# Patient Record
Sex: FEMALE | Race: WHITE | NOT HISPANIC OR LATINO | Employment: FULL TIME | URBAN - METROPOLITAN AREA
[De-identification: names, ages, dates, MRNs, and addresses within clinical notes are randomized per-mention and may not be internally consistent; named-entity substitution may affect disease eponyms.]

---

## 2019-09-29 ENCOUNTER — HOSPITAL ENCOUNTER (OUTPATIENT)
Facility: HOSPITAL | Age: 54
Discharge: HOME | End: 2019-09-30
Attending: EMERGENCY MEDICINE | Admitting: ORTHOPAEDIC SURGERY
Payer: MEDICAID

## 2019-09-29 DIAGNOSIS — S52.692A OTHER CLOSED FRACTURE OF DISTAL END OF LEFT ULNA, INITIAL ENCOUNTER: ICD-10-CM

## 2019-09-29 DIAGNOSIS — I95.9 HYPOTENSION, UNSPECIFIED HYPOTENSION TYPE: ICD-10-CM

## 2019-09-29 DIAGNOSIS — M25.532 LEFT WRIST PAIN: Primary | ICD-10-CM

## 2019-09-29 DIAGNOSIS — F10.920 ALCOHOLIC INTOXICATION WITHOUT COMPLICATION: ICD-10-CM

## 2019-09-29 DIAGNOSIS — Z01.810 PREOP CARDIOVASCULAR EXAM: ICD-10-CM

## 2019-09-29 DIAGNOSIS — S52.502A CLOSED FRACTURE OF DISTAL END OF LEFT RADIUS, UNSPECIFIED FRACTURE MORPHOLOGY, INITIAL ENCOUNTER: ICD-10-CM
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
## 2019-09-29 PROBLEM — S52.92XA FOREARM FRACTURES, BOTH BONES, CLOSED, LEFT, INITIAL ENCOUNTER: Status: ACTIVE | Noted: 2019-09-29

## 2019-09-29 PROBLEM — S52.202A FOREARM FRACTURES, BOTH BONES, CLOSED, LEFT, INITIAL ENCOUNTER: Status: ACTIVE | Noted: 2019-09-29

## 2019-09-29 LAB
ALBUMIN SERPL BCP-MCNC: 3.5 G/DL (ref 3.5–5.2)
ALP SERPL-CCNC: 144 U/L (ref 55–135)
ALT SERPL W/O P-5'-P-CCNC: 18 U/L (ref 10–44)
ANION GAP SERPL CALC-SCNC: 13 MMOL/L (ref 8–16)
AST SERPL-CCNC: 22 U/L (ref 10–40)
BASOPHILS # BLD AUTO: 0.07 K/UL (ref 0–0.2)
BASOPHILS NFR BLD: 0.5 % (ref 0–1.9)
BILIRUB SERPL-MCNC: 0.2 MG/DL (ref 0.1–1)
BUN SERPL-MCNC: 15 MG/DL (ref 6–20)
CALCIUM SERPL-MCNC: 8.1 MG/DL (ref 8.7–10.5)
CHLORIDE SERPL-SCNC: 107 MMOL/L (ref 95–110)
CO2 SERPL-SCNC: 19 MMOL/L (ref 23–29)
CREAT SERPL-MCNC: 0.7 MG/DL (ref 0.5–1.4)
DIFFERENTIAL METHOD: ABNORMAL
EOSINOPHIL # BLD AUTO: 0.1 K/UL (ref 0–0.5)
EOSINOPHIL NFR BLD: 0.6 % (ref 0–8)
ERYTHROCYTE [DISTWIDTH] IN BLOOD BY AUTOMATED COUNT: 12.2 % (ref 11.5–14.5)
EST. GFR  (AFRICAN AMERICAN): >60 ML/MIN/1.73 M^2
EST. GFR  (NON AFRICAN AMERICAN): >60 ML/MIN/1.73 M^2
ESTIMATED AVG GLUCOSE: 100 MG/DL (ref 68–131)
GLUCOSE SERPL-MCNC: 101 MG/DL (ref 70–110)
HBA1C MFR BLD HPLC: 5.1 % (ref 4–5.6)
HCT VFR BLD AUTO: 40.5 % (ref 37–48.5)
HGB BLD-MCNC: 13.6 G/DL (ref 12–16)
IMM GRANULOCYTES # BLD AUTO: 0.05 K/UL (ref 0–0.04)
IMM GRANULOCYTES NFR BLD AUTO: 0.4 % (ref 0–0.5)
INR PPP: 1.1 (ref 0.8–1.2)
LYMPHOCYTES # BLD AUTO: 1.7 K/UL (ref 1–4.8)
LYMPHOCYTES NFR BLD: 12.1 % (ref 18–48)
MAGNESIUM SERPL-MCNC: 1.9 MG/DL (ref 1.6–2.6)
MCH RBC QN AUTO: 31.9 PG (ref 27–31)
MCHC RBC AUTO-ENTMCNC: 33.6 G/DL (ref 32–36)
MCV RBC AUTO: 95 FL (ref 82–98)
MONOCYTES # BLD AUTO: 0.8 K/UL (ref 0.3–1)
MONOCYTES NFR BLD: 5.4 % (ref 4–15)
NEUTROPHILS # BLD AUTO: 11.6 K/UL (ref 1.8–7.7)
NEUTROPHILS NFR BLD: 81 % (ref 38–73)
NRBC BLD-RTO: 0 /100 WBC
PHOSPHATE SERPL-MCNC: 3.5 MG/DL (ref 2.7–4.5)
PLATELET # BLD AUTO: 272 K/UL (ref 150–350)
PMV BLD AUTO: 9.7 FL (ref 9.2–12.9)
POTASSIUM SERPL-SCNC: 3.3 MMOL/L (ref 3.5–5.1)
PREALB SERPL-MCNC: 22 MG/DL (ref 20–43)
PROT SERPL-MCNC: 6.5 G/DL (ref 6–8.4)
PROTHROMBIN TIME: 11 SEC (ref 9–12.5)
RBC # BLD AUTO: 4.27 M/UL (ref 4–5.4)
SODIUM SERPL-SCNC: 139 MMOL/L (ref 136–145)
TRANSFERRIN SERPL-MCNC: 256 MG/DL (ref 200–375)
WBC # BLD AUTO: 14.27 K/UL (ref 3.9–12.7)

## 2019-09-29 PROCEDURE — 93010 ELECTROCARDIOGRAM REPORT: CPT | Mod: ,,, | Performed by: INTERNAL MEDICINE

## 2019-09-29 PROCEDURE — 96375 TX/PRO/DX INJ NEW DRUG ADDON: CPT

## 2019-09-29 PROCEDURE — 63600175 PHARM REV CODE 636 W HCPCS: Performed by: STUDENT IN AN ORGANIZED HEALTH CARE EDUCATION/TRAINING PROGRAM

## 2019-09-29 PROCEDURE — 83735 ASSAY OF MAGNESIUM: CPT

## 2019-09-29 PROCEDURE — 96361 HYDRATE IV INFUSION ADD-ON: CPT

## 2019-09-29 PROCEDURE — 99285 EMERGENCY DEPT VISIT HI MDM: CPT | Mod: 25

## 2019-09-29 PROCEDURE — 83036 HEMOGLOBIN GLYCOSYLATED A1C: CPT

## 2019-09-29 PROCEDURE — G0378 HOSPITAL OBSERVATION PER HR: HCPCS

## 2019-09-29 PROCEDURE — 84466 ASSAY OF TRANSFERRIN: CPT

## 2019-09-29 PROCEDURE — 84134 ASSAY OF PREALBUMIN: CPT

## 2019-09-29 PROCEDURE — 99285 PR EMERGENCY DEPT VISIT,LEVEL V: ICD-10-PCS | Mod: ,,, | Performed by: EMERGENCY MEDICINE

## 2019-09-29 PROCEDURE — 93005 ELECTROCARDIOGRAM TRACING: CPT

## 2019-09-29 PROCEDURE — 85025 COMPLETE CBC W/AUTO DIFF WBC: CPT

## 2019-09-29 PROCEDURE — 85610 PROTHROMBIN TIME: CPT

## 2019-09-29 PROCEDURE — 84100 ASSAY OF PHOSPHORUS: CPT

## 2019-09-29 PROCEDURE — 96374 THER/PROPH/DIAG INJ IV PUSH: CPT

## 2019-09-29 PROCEDURE — 80053 COMPREHEN METABOLIC PANEL: CPT

## 2019-09-29 PROCEDURE — 99285 EMERGENCY DEPT VISIT HI MDM: CPT | Mod: ,,, | Performed by: EMERGENCY MEDICINE

## 2019-09-29 PROCEDURE — 93010 EKG 12-LEAD: ICD-10-PCS | Mod: ,,, | Performed by: INTERNAL MEDICINE

## 2019-09-29 PROCEDURE — 25000003 PHARM REV CODE 250: Performed by: STUDENT IN AN ORGANIZED HEALTH CARE EDUCATION/TRAINING PROGRAM

## 2019-09-29 PROCEDURE — 25605 CLTX DST RDL FX/EPHYS SEP W/: CPT | Mod: LT

## 2019-09-29 RX ORDER — ONDANSETRON 2 MG/ML
4 INJECTION INTRAMUSCULAR; INTRAVENOUS
Status: COMPLETED | OUTPATIENT
Start: 2019-09-29 | End: 2019-09-29

## 2019-09-29 RX ORDER — SODIUM CHLORIDE 0.9 % (FLUSH) 0.9 %
2 SYRINGE (ML) INJECTION
Status: DISCONTINUED | OUTPATIENT
Start: 2019-09-30 | End: 2019-09-30 | Stop reason: HOSPADM

## 2019-09-29 RX ORDER — LIDOCAINE HYDROCHLORIDE 10 MG/ML
20 INJECTION INFILTRATION; PERINEURAL ONCE
Status: COMPLETED | OUTPATIENT
Start: 2019-09-29 | End: 2019-09-29

## 2019-09-29 RX ORDER — OXYCODONE HYDROCHLORIDE 5 MG/1
5 TABLET ORAL EVERY 4 HOURS PRN
Status: DISCONTINUED | OUTPATIENT
Start: 2019-09-30 | End: 2019-09-30 | Stop reason: HOSPADM

## 2019-09-29 RX ORDER — ACETAMINOPHEN 325 MG/1
650 TABLET ORAL EVERY 4 HOURS PRN
Status: DISCONTINUED | OUTPATIENT
Start: 2019-09-30 | End: 2019-09-30 | Stop reason: HOSPADM

## 2019-09-29 RX ORDER — LIDOCAINE HYDROCHLORIDE AND EPINEPHRINE 10; 10 MG/ML; UG/ML
10 INJECTION, SOLUTION INFILTRATION; PERINEURAL ONCE
Status: DISCONTINUED | OUTPATIENT
Start: 2019-09-29 | End: 2019-09-30 | Stop reason: HOSPADM

## 2019-09-29 RX ORDER — CEFAZOLIN SODIUM 1 G/3ML
2 INJECTION, POWDER, FOR SOLUTION INTRAMUSCULAR; INTRAVENOUS ONCE
Status: COMPLETED | OUTPATIENT
Start: 2019-09-29 | End: 2019-09-29

## 2019-09-29 RX ORDER — MORPHINE SULFATE 4 MG/ML
4 INJECTION, SOLUTION INTRAMUSCULAR; INTRAVENOUS
Status: COMPLETED | OUTPATIENT
Start: 2019-09-29 | End: 2019-09-29

## 2019-09-29 RX ORDER — ONDANSETRON 2 MG/ML
8 INJECTION INTRAMUSCULAR; INTRAVENOUS EVERY 12 HOURS PRN
Status: DISCONTINUED | OUTPATIENT
Start: 2019-09-30 | End: 2019-09-30 | Stop reason: HOSPADM

## 2019-09-29 RX ADMIN — LIDOCAINE HYDROCHLORIDE 20 ML: 10 INJECTION, SOLUTION INFILTRATION; PERINEURAL at 10:09

## 2019-09-29 RX ADMIN — CEFAZOLIN 2 G: 1 INJECTION, POWDER, FOR SOLUTION INTRAMUSCULAR; INTRAVENOUS at 11:09

## 2019-09-29 RX ADMIN — ONDANSETRON 4 MG: 2 INJECTION INTRAMUSCULAR; INTRAVENOUS at 08:09

## 2019-09-29 RX ADMIN — SODIUM CHLORIDE, SODIUM LACTATE, POTASSIUM CHLORIDE, AND CALCIUM CHLORIDE 1000 ML: .6; .31; .03; .02 INJECTION, SOLUTION INTRAVENOUS at 08:09

## 2019-09-29 RX ADMIN — MORPHINE SULFATE 4 MG: 4 INJECTION, SOLUTION INTRAMUSCULAR; INTRAVENOUS at 08:09

## 2019-09-30 VITALS
OXYGEN SATURATION: 92 % | HEART RATE: 65 BPM | SYSTOLIC BLOOD PRESSURE: 125 MMHG | TEMPERATURE: 98 F | DIASTOLIC BLOOD PRESSURE: 81 MMHG | RESPIRATION RATE: 18 BRPM

## 2019-09-30 PROBLEM — S52.502A CLOSED FRACTURE OF LEFT DISTAL RADIUS AND ULNA: Status: ACTIVE | Noted: 2019-09-29

## 2019-09-30 PROBLEM — S52.602A CLOSED FRACTURE OF LEFT DISTAL RADIUS AND ULNA: Status: ACTIVE | Noted: 2019-09-29

## 2019-09-30 PROCEDURE — 25605 CLTX DST RDL FX/EPHYS SEP W/: CPT | Mod: LT

## 2019-09-30 PROCEDURE — G0378 HOSPITAL OBSERVATION PER HR: HCPCS

## 2019-09-30 PROCEDURE — 63600175 PHARM REV CODE 636 W HCPCS: Performed by: STUDENT IN AN ORGANIZED HEALTH CARE EDUCATION/TRAINING PROGRAM

## 2019-09-30 PROCEDURE — 25000003 PHARM REV CODE 250: Performed by: STUDENT IN AN ORGANIZED HEALTH CARE EDUCATION/TRAINING PROGRAM

## 2019-09-30 RX ORDER — ONDANSETRON 4 MG/1
8 TABLET, FILM COATED ORAL EVERY 8 HOURS PRN
Qty: 60 TABLET | Refills: 0 | Status: ON HOLD | OUTPATIENT
Start: 2019-09-30 | End: 2019-10-10 | Stop reason: HOSPADM

## 2019-09-30 RX ORDER — OXYCODONE AND ACETAMINOPHEN 10; 325 MG/1; MG/1
1 TABLET ORAL EVERY 4 HOURS PRN
Qty: 42 TABLET | Refills: 0 | Status: SHIPPED | OUTPATIENT
Start: 2019-09-30 | End: 2019-10-07

## 2019-09-30 RX ADMIN — ONDANSETRON 8 MG: 2 INJECTION INTRAMUSCULAR; INTRAVENOUS at 08:09

## 2019-09-30 RX ADMIN — OXYCODONE HYDROCHLORIDE 15 MG: 10 TABLET ORAL at 05:09

## 2019-09-30 RX ADMIN — OXYCODONE HYDROCHLORIDE 15 MG: 10 TABLET ORAL at 12:09

## 2019-09-30 NOTE — ED NOTES
Patient identifiers verified and correct for Stephanie Molina.    LOC: The patient is awake, alert and aware of environment with an appropriate affect, the patient is oriented x 3 and speaking appropriately. Arrived in left arm, sling device     APPEARANCE: Patient resting comfortably and in no acute distress, patient is clean and well groomed, patient's clothing is properly fastened.    SKIN: The skin is warm and dry, color consistent with ethnicity, patient has normal skin turgor and moist mucus membranes, skin intact, no breakdown or bruising noted.    MUSCULOSKELETAL: Patient moving all extremities spontaneously, left arm visible deformity     RESPIRATORY: Airway is open and patent, respirations are spontaneous, patient has a normal effort and rate, no accessory muscle use noted, bilateral breath sounds clear    CARDIAC: Patient has a normal rate and regular rhythm, left arm edema,  capillary refill < 3 seconds.    ABDOMEN: Soft and non tender to palpation, no distention noted, normoactive bowel sounds present in all four quadrants.    NEUROLOGIC: PERRLA, 3 mm bilaterally, eyes open spontaneously, behavior appropriate to situation, follows commands, facial expression symmetrical

## 2019-09-30 NOTE — ED PROVIDER NOTES
Encounter Date: 9/29/2019       History     Chief Complaint   Patient presents with    Fall     Pt arrives via EMS after falling from 2 steps. Deformity noted to left arm. +ETOH.     HPI   54 year old female history of tobacco use and marijuana use presenting today after missing her step and falling from two steps without any head trauma, LOC. She caught herself with her left hand and has significant 10/10 sharp shooting pain in her left wrist and believes it is broken. She denies any numbness or tingling in her arm, denies shoulder pain. She denies any other pain or sustained trauma. Was drinking today and is mildly intoxicated, denies any preceding vision changes, chest pain, shortness of breath, nausea, vomiting, or abdominal pain. Is not up to date on her tetanus, is not on blood thinners and has not taken anything for the pain. She's had prior left wrist fractures. Did not receive any pain medication with EMS.   Currently feeling nauseous.     Review of patient's allergies indicates:  No Known Allergies  History reviewed. No pertinent past medical history.  Past Surgical History:   Procedure Laterality Date    ceserean       No family history on file.  Social History     Tobacco Use    Smoking status: Current Every Day Smoker    Smokeless tobacco: Never Used   Substance Use Topics    Alcohol use: Yes    Drug use: Yes     Types: Marijuana     Review of Systems   Constitutional: Negative for chills and fever.   HENT: Negative for congestion and drooling.    Eyes: Negative for photophobia and visual disturbance.   Respiratory: Negative for cough, chest tightness and shortness of breath.    Cardiovascular: Negative for chest pain and leg swelling.   Gastrointestinal: Positive for nausea. Negative for abdominal distention, abdominal pain and vomiting.   Genitourinary: Negative for difficulty urinating and dysuria.   Musculoskeletal: Negative for back pain, neck pain and neck stiffness.   Skin: Positive for  wound. Negative for rash.   Neurological: Negative for dizziness, seizures, syncope, light-headedness and headaches.   All other systems reviewed and are negative.      Physical Exam     Initial Vitals [09/29/19 1956]   BP Pulse Resp Temp SpO2   100/60 88 16 98.9 °F (37.2 °C) 99 %      MAP       --         Physical Exam    Nursing note and vitals reviewed.  Constitutional: She appears well-developed and well-nourished.   Uncomfortable appearing woman    HENT:   Head: Normocephalic and atraumatic.   Eyes: EOM are normal. Pupils are equal, round, and reactive to light.   Neck: Normal range of motion. Neck supple.   Cardiovascular: Normal rate and regular rhythm.   Pulmonary/Chest: Breath sounds normal. No respiratory distress.   Abdominal: Soft. She exhibits no distension.   Musculoskeletal:   Obvious deformity to her left wrist, Extreme tenderness to slight palpation at the wrist, intact sensation throughout each digit, intact distal pulse, no open bone or laceration  Inability to range at the wrist, intact shoulder strength, no shoulder TTP, decreased ROM at the elbow.    Neurological: She is alert and oriented to person, place, and time.   Slurring her speech, acutely intoxicated          ED Course   Procedures  Labs Reviewed - No data to display       Imaging Results    None                       Attending Attestation:   Physician Attestation Statement for Resident:  As the supervising MD   Physician Attestation Statement: I have personally seen and examined this patient.   I agree with the above history. -:   As the supervising MD I agree with the above PE.   - with the following exceptions: Slight paresthesia in the ulnar distribution of affected hand, concerning for nerve compression   As the supervising MD I agree with the above treatment, course, plan, and disposition.                       Clinical Impression:       ICD-10-CM ICD-9-CM   1. Left wrist pain M25.532 719.43   2. Fall W19.XXXA E888.9       54  year old female otherwise healthy presenting today after a mechanical fall with sustained left wrist pain. On examination patients vitals are significant for a blood pressure of 100/60 but otherwise vitals within normal limits. On examination she is slightly slurring her speech secondary to acute intoxication. She has not signs of head or neck trauma, she has a deformity noted to her left wrist with intact sensation and distal pulses. Overlying skin intact without any exposed bone. She has intact shoulder strength and decreased flexion and extension at the elbow.   Based on history and physical examination I believe she has a fracture of her wrist. Differential diagnosis includes metacarpal fractures, radial fracture, ulnar fracture, humerus fracture.   Will update her tetanus, give her zofran for nausea, and morphine for pain control. Given her hypotension after morphine will give her a liter of fluids.     Dispo pending imaging. I anticipate an orthopedic surgery consult.   patient stable at this time.     [facility] Emergency Medicine, PGY2   9/29/2019 8:29 PM          PGY-2 Update:  X-ray concerning for ulnar and radial fracture  See read below:  Comminuted impacted fracture distal left radius with intra-articular extension and posterior displacement and angulation of fracture fragments. Comminuted impacted fracture distal left ulna with intra-articular extension and posterior displacement and angulation of fracture fragments    Orthopedic surgery consulted, will await their revaluation and recommendations.   [facility] Emergency Medicine, PGY2   9/29/2019 9:26 PM      PGY-2 Update:  Orthopedic surgery at bedside placing patient in finger traps. Preparing for reduction.     [facility] Emergency Medicine, PGY2   9/29/2019 9:44 PM      PGY-2 Update:  Patient admitted to orthopedic services given concerns over carpal tunnel nerve involvement.     [facility] Emergency  Medicine, PGY2   9/29/2019 11:28 PM         Jaquelin Joseph MD  Resident  09/29/19 2332       Amanda Dubose MD  09/30/19 0157

## 2019-09-30 NOTE — SUBJECTIVE & OBJECTIVE
History reviewed. No pertinent past medical history.    Past Surgical History:   Procedure Laterality Date    ceserean         Review of patient's allergies indicates:  No Known Allergies    Current Facility-Administered Medications   Medication    [START ON 9/30/2019] acetaminophen tablet 650 mg    lidocaine-EPINEPHrine 1%-1:100,000 injection 10 mL    [START ON 9/30/2019] ondansetron injection 8 mg    [START ON 9/30/2019] oxyCODONE immediate release tablet 15 mg    [START ON 9/30/2019] oxyCODONE immediate release tablet 5 mg    [START ON 9/30/2019] promethazine (PHENERGAN) 6.25 mg in dextrose 5 % 50 mL IVPB    [START ON 9/30/2019] sodium chloride 0.9% flush 2 mL    Tdap vaccine injection 0.5 mL     No current outpatient medications on file.     Family History     None        Tobacco Use    Smoking status: Current Every Day Smoker    Smokeless tobacco: Never Used   Substance and Sexual Activity    Alcohol use: Yes    Drug use: Yes     Types: Marijuana    Sexual activity: Not on file     ROS   ROS per ED note.  Objective:     Vital Signs (Most Recent):  Temp: 98.9 °F (37.2 °C) (09/29/19 1956)  Pulse: 88 (09/29/19 2241)  Resp: 19 (09/29/19 2204)  BP: 125/72 (09/29/19 2241)  SpO2: 95 % (09/29/19 2244) Vital Signs (24h Range):  Temp:  [98.9 °F (37.2 °C)] 98.9 °F (37.2 °C)  Pulse:  [62-88] 88  Resp:  [16-20] 19  SpO2:  [88 %-99 %] 95 %  BP: ()/(60-84) 125/72           There is no height or weight on file to calculate BMI.      Intake/Output Summary (Last 24 hours) at 9/29/2019 6270  Last data filed at 9/29/2019 2257  Gross per 24 hour   Intake 1000 ml   Output --   Net 1000 ml       Ortho/SPM Exam   PE:  Gen:  No acute distress  CV:  Peripherally well-perfused.    Lungs:  Normal respiratory effort.  Head/Neck:  Normocephalic.  Atraumatic.     LUE:  Skin intact, 3mm scratch to volar aspect of L wrist   Obvious deformity to Left wrist  No open wounds/abrasions/laceration  Bony TTP to Left wrist  ROM at  Left wrist, elbow and hand limited 2/2 to L wrist pain.  SILT U/R  Diminished sensation to light touch in median nerve distribution  Motor intact AIN/PIN/M/U/R   Cap refill < 2s  2+ RP      Significant Labs: All pertinent labs within the past 24 hours have been reviewed.    Significant Imaging: I have reviewed all pertinent imaging results/findings.     Procedure Note: left distal radius reduction  Patient was explained risks, benefits, and alternatives to treatment and verbalized consent to proceed. Time out was performed and patient name, , site, and procedure were confirmed. 10 cc of 1% lidocaine in 25 gauge needle was used for hematoma block. Fracture was reduced under fluoroscopy. Sugar tong splint was applied in typical fashion. Post-reduction films were performed and confirmed adequate reduction. Patient tolerated the procedure well.

## 2019-09-30 NOTE — ASSESSMENT & PLAN NOTE
Stephanie Molina is a 54 y.o. female with a left distal radius and ulna fracture. She has a history of left distal radius fracture 2 years ago treated non-operatively. There are no signs of open fracture. She did present with numbness in the thumb, index, and long fingers concerning for acute carpal tunnel syndrome. Her fracture was reduced and splinted in the ED. Her numbness and tingling began to resolve shortly after reduction. Will admit to observation for precaution to monitor for return of CTS symptoms.   - NWB LUE  - keep LUE iced and elevated  - NPO as precaution

## 2019-09-30 NOTE — ED TRIAGE NOTES
"Stephanie Molina, a 54 y.o. female presents to the ED post fall. Patient drank a few drinks( vodka) and fell from 2 steps. Patient tried to brace the fall with her left arm and " I think I broke it"        Triage note:  Chief Complaint   Patient presents with    Fall     Pt arrives via EMS after falling from 2 steps. Deformity noted to left arm. +ETOH.     Review of patient's allergies indicates:  No Known Allergies  History reviewed. No pertinent past medical history.    "

## 2019-09-30 NOTE — H&P
[facility]  Orthopedics  H&P    Patient Name: Stephanie Molina  MRN: [id number]  Admission Date: 9/29/2019  Primary Care Provider: Primary Doctor No      Subjective:     Principal Problem:Closed fracture of left distal radius and ulna    Chief Complaint:   Chief Complaint   Patient presents with    Fall     Pt arrives via EMS after falling from 2 steps. Deformity noted to left arm. +ETOH.        HPI: Stephanie Molina is a 54 y.o. female with PMHx of prior Left distal radius fracture (2017- tx non-operatively) presents to ED with left wrist pain after fall onto outstretched left hand around 7:00PM. The patient states she was walking upstairs and missed a step causing her to fall down 2 steps onto her left hand. The patient states she noticed immediate pain, swelling and deformity to her Left wrist. She states the pain is worsened by any movement of her left wrist or left fingers, and improved at rest. The patient endorses N/T to the thumb, index, and long fingers which has been present since her fall, but denies N/T to 4th-5th digits. The patient is Right hand dominant and works as an . The patient denies anticoagulant use. She is a daily smoker. Denies any other MSK pains or paresthesias.       History reviewed. No pertinent past medical history.    Past Surgical History:   Procedure Laterality Date    ceserean         Review of patient's allergies indicates:  No Known Allergies    Current Facility-Administered Medications   Medication    [START ON 9/30/2019] acetaminophen tablet 650 mg    lidocaine-EPINEPHrine 1%-1:100,000 injection 10 mL    [START ON 9/30/2019] ondansetron injection 8 mg    [START ON 9/30/2019] oxyCODONE immediate release tablet 15 mg    [START ON 9/30/2019] oxyCODONE immediate release tablet 5 mg    [START ON 9/30/2019] promethazine (PHENERGAN) 6.25 mg in dextrose 5 % 50 mL IVPB    [START ON 9/30/2019] sodium chloride 0.9% flush 2 mL    Tdap vaccine injection 0.5  mL     No current outpatient medications on file.     Family History     None        Tobacco Use    Smoking status: Current Every Day Smoker    Smokeless tobacco: Never Used   Substance and Sexual Activity    Alcohol use: Yes    Drug use: Yes     Types: Marijuana    Sexual activity: Not on file     ROS   ROS per ED note.  Objective:     Vital Signs (Most Recent):  Temp: 98.9 °F (37.2 °C) (19)  Pulse: 88 (19)  Resp: 19 (19)  BP: 125/72 (19)  SpO2: 95 % (19) Vital Signs (24h Range):  Temp:  [98.9 °F (37.2 °C)] 98.9 °F (37.2 °C)  Pulse:  [62-88] 88  Resp:  [16-20] 19  SpO2:  [88 %-99 %] 95 %  BP: ()/(60-84) 125/72           There is no height or weight on file to calculate BMI.      Intake/Output Summary (Last 24 hours) at 2019 2323  Last data filed at 2019 2257  Gross per 24 hour   Intake 1000 ml   Output --   Net 1000 ml       Ortho/SPM Exam   PE:  Gen:  No acute distress  CV:  Peripherally well-perfused.    Lungs:  Normal respiratory effort.  Head/Neck:  Normocephalic.  Atraumatic.      LUE:  Skin intact, 3mm scratch to volar aspect of L wrist   Obvious deformity to Left wrist  No open wounds/abrasions/laceration  Bony TTP to Left wrist  ROM at Left wrist, elbow and hand limited 2/2 to L wrist pain.  SILT U/R  Diminished sensation to light touch in median nerve distribution  Motor intact AIN/PIN/M/U/R   Cap refill < 2s  2+ RP      Significant Labs: All pertinent labs within the past 24 hours have been reviewed.    Significant Imaging: I have reviewed all pertinent imaging results/findings.     Procedure Note: left distal radius reduction  Patient was explained risks, benefits, and alternatives to treatment and verbalized consent to proceed. Time out was performed and patient name, , site, and procedure were confirmed. 10 cc of 1% lidocaine in 25 gauge needle was used for hematoma block. Fracture was reduced under fluoroscopy. Sugar tong  splint was applied in typical fashion. Post-reduction films were performed and confirmed adequate reduction. Patient tolerated the procedure well.         Assessment/Plan:     * Closed fracture of left distal radius and ulna  Stephanie Molina is a 54 y.o. female with a left distal radius and ulna fracture. She has a history of left distal radius fracture 2 years ago treated non-operatively. There are no signs of open fracture. She did present with numbness in the thumb, index, and long fingers concerning for acute carpal tunnel syndrome. Her fracture was reduced and splinted in the ED. Her numbness and tingling began to resolve shortly after reduction. Will admit to observation for precaution to monitor for return of CTS symptoms.   - NWB LUE  - keep LUE iced and elevated  - NPO as precaution        Ash Ambriz MD  Orthopedics  [facility]

## 2019-09-30 NOTE — HPI
Stephanie Molina is a 54 y.o. female with PMHx of prior Left distal radius fracture (2017- tx non-operatively) presents to ED with left wrist pain after fall onto outstretched left hand around 7:00PM. The patient states she was walking upstairs and missed a step causing her to fall down 2 steps onto her left hand. The patient states she noticed immediate pain, swelling and deformity to her Left wrist. She states the pain is worsened by any movement of her left wrist or left fingers, and improved at rest. The patient endorses N/T to the thumb, index, and long fingers which has been present since her fall, but denies N/T to 4th-5th digits. The patient is Right hand dominant and works as an . The patient denies anticoagulant use. She is a daily smoker. Denies any other MSK pains or paresthesias.

## 2019-09-30 NOTE — NURSING
Pt given prescriptions and copy of discharge home instructions. Pt denies pain at the this time. Pt educated on signs and symptoms of when to call the doctor. All belongings with the patient. Pt verbalized understanding of all discharge instructions.

## 2019-10-01 NOTE — HOSPITAL COURSE
Patient presented to [facility] ED on 9/29/19 with a left distal radius fracture. Upon presentation she was found to have acute carpal tunnel syndrome due to her fracture. Her fracture was reduced in the ED and splinted by orthopedics.  Shortly after reduction, her symptoms of acute carpal tunnel were relieved.  She was admitted to observation under the orthopedic surgery service at [facility] to observe for acute carpal tunnel syndrome.  After an appropriate time of observation, the patient was discharged to home and will follow up as an outpatient for surgery for her fractures.

## 2019-10-01 NOTE — DISCHARGE SUMMARY
[facility]  Orthopedics  Discharge Summary      Patient Name: Stephanie Molina  MRN: [id number]  Admission Date: 9/29/2019  Hospital Length of Stay: 0 days  Discharge Date and Time: 9/30/2019   Attending Physician: Jason Gandhi MD   Discharging Provider: Ash Ambriz MD  Primary Care Provider: Primary Doctor No    HPI:   Stephanie Molina is a 54 y.o. female with PMHx of prior Left distal radius fracture (2017- tx non-operatively) presents to ED with left wrist pain after fall onto outstretched left hand around 7:00PM. The patient states she was walking upstairs and missed a step causing her to fall down 2 steps onto her left hand. The patient states she noticed immediate pain, swelling and deformity to her Left wrist. She states the pain is worsened by any movement of her left wrist or left fingers, and improved at rest. The patient endorses N/T to the thumb, index, and long fingers which has been present since her fall, but denies N/T to 4th-5th digits. The patient is Right hand dominant and works as an . The patient denies anticoagulant use. She is a daily smoker. Denies any other MSK pains or paresthesias.       * No surgery found *      Hospital Course:  Patient presented to [facility] ED on 9/29/19 with a left distal radius fracture. Upon presentation she was found to have acute carpal tunnel syndrome due to her fracture. Her fracture was reduced in the ED and splinted by orthopedics.  Shortly after reduction, her symptoms of acute carpal tunnel were relieved.  She was admitted to observation under the orthopedic surgery service at [facility] to observe for acute carpal tunnel syndrome.  After an appropriate time of observation, the patient was discharged to home and will follow up as an outpatient for surgery for her fractures.    Consults (From admission, onward)        Status Ordering Provider     Inpatient consult to Orthopedic Surgery  Once     Provider:  (Not yet assigned)     Completed CAITLIN HINDS          Significant Diagnostic Studies: No pertinent studies.    Pending Diagnostic Studies:     None        Final Active Diagnoses:    Diagnosis Date Noted POA    PRINCIPAL PROBLEM:  Closed fracture of left distal radius and ulna [S52.502A, S52.602A] 09/29/2019 Yes    Left wrist pain [M25.532] 09/29/2019 Yes      Problems Resolved During this Admission:      Discharged Condition: good    Disposition: Home or Self Care    Follow Up:  Follow-up Information     Go to  [facility].    Specialty:  Emergency Medicine  Why:  As needed, If symptoms worsen  Contact information:  [address]  [facility] [id number] [phone]           Schedule an appointment as soon as possible for a visit  with [facility].    Specialty:  Internal Medicine  Why:  To follow-up on your Emergency Department visit  Contact information:  [address] [facility] [address]  [phone]             Schedule an appointment as soon as possible for a visit  with [facility].    Specialty:  Orthopedics  Why:  To follow-up on your Emergency Department visit, For wound re-check  Contact information:  [address], [address]  [facility] [address] [phone]  Additional information:  [facility] - [address]th Floor               Patient Instructions:   No discharge procedures on file.  Medications:  Reconciled Home Medications:      Medication List      START taking these medications    ondansetron 4 MG tablet  Commonly known as:  ZOFRAN  Take 2 tablets (8 mg total) by mouth every 8 (eight) hours as needed for Nausea.     oxyCODONE-acetaminophen  mg per tablet  Commonly known as:  PERCOCET  Take 1 tablet by mouth every 4 (four) hours as needed for Pain.            Ash Ambriz MD  Orthopedics  [facility]

## 2019-10-02 ENCOUNTER — TELEPHONE (OUTPATIENT)
Dept: ORTHOPEDICS | Facility: HOSPITAL | Age: 54
End: 2019-10-02

## 2019-10-02 NOTE — TELEPHONE ENCOUNTER
Spoke with pt. Informed pt the resident she spoke with was Luis Ambriz. Jose R already started the procedure for her to go to [place] for the surgery on her wrist and she should be expecting a phone call from them soon. Pt thanked me for my help.

## 2019-10-02 NOTE — TELEPHONE ENCOUNTER
----- Message from Rubia Nguyen sent at 10/2/2019 11:41 AM CDT -----  Contact: Patient  Patient called in regards to having an referral sent over to [facility] in [place], [place] in order for patient to be sched for surgery           Patient can be reached at : [phone]

## 2020-12-22 ENCOUNTER — OFFICE VISIT (OUTPATIENT)
Dept: URGENT CARE | Facility: CLINIC | Age: 55
End: 2020-12-22
Payer: MEDICAID

## 2020-12-22 VITALS
SYSTOLIC BLOOD PRESSURE: 157 MMHG | HEIGHT: 67 IN | DIASTOLIC BLOOD PRESSURE: 94 MMHG | OXYGEN SATURATION: 96 % | HEART RATE: 72 BPM | RESPIRATION RATE: 18 BRPM | BODY MASS INDEX: 27.47 KG/M2 | WEIGHT: 175 LBS | TEMPERATURE: 98 F

## 2020-12-22 DIAGNOSIS — Z71.6 TOBACCO ABUSE COUNSELING: ICD-10-CM

## 2020-12-22 DIAGNOSIS — M79.604 RIGHT LEG PAIN: ICD-10-CM

## 2020-12-22 DIAGNOSIS — S76.011A STRAIN OF RIGHT HIP, INITIAL ENCOUNTER: Primary | ICD-10-CM

## 2020-12-22 DIAGNOSIS — M25.551 RIGHT HIP PAIN: ICD-10-CM

## 2020-12-22 PROCEDURE — 99214 OFFICE O/P EST MOD 30 MIN: CPT | Mod: S$GLB,,, | Performed by: PHYSICIAN ASSISTANT

## 2020-12-22 PROCEDURE — 73502 X-RAY EXAM HIP UNI 2-3 VIEWS: CPT | Mod: FY,RT,S$GLB, | Performed by: RADIOLOGY

## 2020-12-22 PROCEDURE — 73502 XR HIP 2 VIEW RIGHT: ICD-10-PCS | Mod: FY,RT,S$GLB, | Performed by: RADIOLOGY

## 2020-12-22 PROCEDURE — 99214 PR OFFICE/OUTPT VISIT, EST, LEVL IV, 30-39 MIN: ICD-10-PCS | Mod: S$GLB,,, | Performed by: PHYSICIAN ASSISTANT

## 2020-12-22 RX ORDER — DICLOFENAC SODIUM 50 MG/1
50 TABLET, DELAYED RELEASE ORAL 2 TIMES DAILY PRN
Qty: 30 TABLET | Refills: 0 | Status: SHIPPED | OUTPATIENT
Start: 2020-12-22

## 2020-12-22 RX ORDER — KETOROLAC TROMETHAMINE 30 MG/ML
30 INJECTION, SOLUTION INTRAMUSCULAR; INTRAVENOUS
Status: COMPLETED | OUTPATIENT
Start: 2020-12-22 | End: 2020-12-22

## 2020-12-22 RX ADMIN — KETOROLAC TROMETHAMINE 30 MG: 30 INJECTION, SOLUTION INTRAMUSCULAR; INTRAVENOUS at 05:12

## 2020-12-22 NOTE — PATIENT INSTRUCTIONS
PLEASE READ YOUR DISCHARGE INSTRUCTIONS ENTIRELY AS IT CONTAINS IMPORTANT INFORMATION.  - OTC Tylenol/anti-inflammatory as needed for pain  - do not use OTC anti-inflammatory if taking prescribed (Rx) anti-inflammatory; start Rx inflammatory tomorrow.  Because you were given a concentrated anti-inflammatory injection in clinic.  - continue heat/ice compression, rice therapy, and muscle stretches.   - Radiographs and all diagnostic testing reviewed with patient  - if no improvement or worsening symptoms, recommend follow-up with *orthopedics for further evaluation.  Please call the number below to set up appointment; a referral has been placed.  - If you smoke, please stop smoking.  - discussed weight loss given obesity  -You must understand that you've received an Urgent Care treatment only and that you may be released before all your medical problems are known or treated. You, the patient, will arrange for follow up care as instructed. Please arrange follow up with your primary medical clinic within 2-5 days if your signs and symptoms have not resolved or worsen.   - Follow up with your PCP or specialty clinic as directed.  You can call [phone] or [phone] to schedule an appointment with the appropriate provider.    - If your condition worsens or fails to improve we recommend that you receive another evaluation at the emergency room immediately or contact your primary medical clinic to discuss your concerns in next 2-5 days.  Strict ER versus clinic precautions given.      RED FLAGS/WARNING SYMPTOMS DISCUSSED WITH PATIENT THAT WOULD WARRANT EMERGENT MEDICAL ATTENTION. Patient aware and verbalized understanding.    Hip Strain    You have a strain of the muscles around the hip joint. A muscle strain is a stretching or tearing of muscle fibers. This causes pain, especially when you move that muscle. There may also be some swelling and bruising.  Home care  · Stay off the injured leg as much as possible  until you can walk on it without pain. If you have a lot of pain with walking, crutches or a walker may be prescribed. These can be rented or purchased at many pharmacies and surgical or orthopedic supply stores. Follow your healthcare provider's advice regarding when to begin putting weight on that leg.  · Apply an ice pack over the injured area for 15 to 20 minutes every 3 to 6 hours. You should do this for the first 24 to 48 hours. You can make an ice pack by filling a plastic bag that seals at the top with ice cubes and then wrapping it with a thin towel. Be careful not to injure your skin with the ice treatments. Ice should never be applied directly to skin. Continue the use of ice packs for relief of pain and swelling as needed. After 48 hours, apply heat (warm shower or warm bath) for 15 to 20 minutes several times a day, or alternate ice and heat.  · You may use over-the-counter pain medicine to control pain, unless another pain medicine was prescribed. If you have chronic liver or kidney disease or ever had a stomach ulcer or GI bleeding, talk with your healthcare provider beforeusing these medicines.  · If you play sports, you may resume these activities when you are able to hop and run on the injured leg without pain.  Follow-up care  Follow up with your healthcare provider, or as advised. If your symptoms do not begin to get better after a week, more tests may be needed.  If X-rays were taken, you will be told of any new findings that may affect your care.  When to seek medical advice  Call your healthcare provider right away if any of these occur:  · Increased swelling or bruising  · Increased pain  · Losing the ability to put weight on the injured side  Date Last Reviewed: 11/19/2015  © 7750-7168 "[employer] [address] [place] [address], [place] [address]. All rights reserved. This information is not intended as a substitute for professional medical care. Always follow your healthcare  professional's instructions.

## 2020-12-22 NOTE — PROGRESS NOTES
"Subjective:       Patient ID: Stephanie Molina is a 55 y.o. female.    Vitals:  height is 5' 7" (1.702 m) and weight is 79.4 kg (175 lb). Her temperature is 98 °F (36.7 °C). Her blood pressure is 157/94 (abnormal) and her pulse is 72. Her respiration is 18 and oxygen saturation is 96%.     Chief Complaint: Pain (RIGHT LEG PAIN)    55-year-old female with a history of current every day smoker (1 pack per day), previous left wrist orif, previous covid infection 11/2020, and previous lumbar spinal injection 10 years ago with resolution of sciatica who presents urgent care clinic for evaluation.  Patient complaining of symptoms that started this morning when she woke up and has progressively worsened.  She is having pain in her right groin/pelvis that radiates down the entirety of her right leg ending at her ankle.  Pain worsens with weight-bearing or walking.  Pain is 8/10 and described as aching/shooting.  Not taking anything for symptoms.  No other associated symptoms.  She feels like her right leg will give out with walking.    No bladder/bowel incontinence, saddle anesthesia, low back pain, abdominal pain, urinary symptoms, URI symptoms, chest pain, shortness breath, or trauma/falls.    Pain  This is a new problem. The current episode started today. The problem occurs constantly. The problem has been unchanged. Associated symptoms include arthralgias, myalgias and weakness. Pertinent negatives include no abdominal pain, anorexia, change in bowel habit, chest pain, chills, congestion, coughing, diaphoresis, fatigue, fever, headaches, joint swelling, nausea, neck pain, numbness, rash, sore throat, swollen glands, urinary symptoms, vertigo, visual change or vomiting. The symptoms are aggravated by walking and standing. She has tried nothing for the symptoms.       Constitution: Negative for activity change, appetite change, chills, sweating, fatigue, fever and generalized weakness.   HENT: Negative for ear pain, " hearing loss, facial swelling, congestion, postnasal drip, sinus pain, sinus pressure, sore throat, trouble swallowing and voice change.    Neck: Negative for neck pain, neck stiffness and painful lymph nodes.   Cardiovascular: Negative for chest pain, leg swelling, palpitations, sob on exertion and passing out.   Eyes: Negative for eye discharge, eye pain, photophobia, vision loss, double vision and blurred vision.   Respiratory: Negative for chest tightness, cough, sputum production, bloody sputum, COPD, shortness of breath, stridor, wheezing and asthma.    Gastrointestinal: Negative for abdominal pain, nausea, vomiting, constipation, diarrhea, bright red blood in stool, rectal bleeding, heartburn and bowel incontinence.   Genitourinary: Negative for dysuria, frequency, urgency, urine decreased, flank pain, bladder incontinence and hematuria.   Musculoskeletal: Positive for joint pain and muscle ache. Negative for trauma, joint swelling, abnormal ROM of joint, back pain, muscle cramps and history of spine disorder.   Skin: Negative for color change, pale, rash and wound.   Allergic/Immunologic: Negative for seasonal allergies, asthma and immunocompromised state.   Neurological: Negative for dizziness, history of vertigo, light-headedness, passing out, facial drooping, speech difficulty, coordination disturbances, loss of balance, headaches, disorientation, altered mental status, loss of consciousness, numbness, tingling and seizures.   Hematologic/Lymphatic: Negative for swollen lymph nodes, easy bruising/bleeding and trouble clotting. Does not bruise/bleed easily.   Psychiatric/Behavioral: Negative for altered mental status and disorientation.       Objective:      Physical Exam   Constitutional: She is oriented to person, place, and time. She appears well-developed. She is cooperative.  Non-toxic appearance. She does not appear ill. No distress.   HENT:   Head: Normocephalic and atraumatic.   Ears:   Right Ear:  Hearing, external ear and ear canal normal. No drainage, swelling or tenderness.   Left Ear: Hearing, external ear and ear canal normal. No drainage, swelling or tenderness.   Nose: Nose normal. No rhinorrhea or purulent discharge. Right sinus exhibits no maxillary sinus tenderness and no frontal sinus tenderness. Left sinus exhibits no maxillary sinus tenderness and no frontal sinus tenderness.   Mouth/Throat: Uvula is midline, oropharynx is clear and moist and mucous membranes are normal. No oral lesions. No trismus in the jaw. No uvula swelling. No oropharyngeal exudate, posterior oropharyngeal edema or posterior oropharyngeal erythema. No tonsillar exudate.   Eyes: Pupils are equal, round, and reactive to light. Conjunctivae, EOM and lids are normal. Right eye exhibits no discharge. Left eye exhibits no discharge. No visual field deficit is present. Right conjunctiva is not injected. Right conjunctiva has no hemorrhage. Left conjunctiva is not injected. Left conjunctiva has no hemorrhage. extraocular movement intactvision grossly intactgaze aligned appropriately  Neck: Normal range of motion and full passive range of motion without pain. Neck supple. No neck rigidity.   Cardiovascular: Normal rate, regular rhythm, normal heart sounds and normal pulses.   No murmur heard.  Pulmonary/Chest: Effort normal and breath sounds normal. No accessory muscle usage or stridor. No respiratory distress. She has no wheezes. She exhibits no tenderness.   Abdominal: Soft. Normal appearance. She exhibits no distension and no mass. There is no splenomegaly or hepatomegaly. There is no abdominal tenderness. There is no rebound, no guarding, no tenderness at McBurney's point, negative Guadarrama's sign, no left CVA tenderness, negative Rovsing's sign and no right CVA tenderness.   Musculoskeletal: Normal range of motion.         General: No tenderness or signs of injury.      Right lower leg: No edema.      Left lower leg: No edema.       Comments: Spinal exam:   Moves all extremities with good strength 5/5  BUE- deltoid, triceps, biceps, wrist ext/flexion, hand , and interossei  BLE- hip flexion, knee ext/flexion, dorsiflexion, plantar flexion, EHL, ankle inversion, and ankle eversion    No drift or dysmetria.   Gait antalgic.      Negative straight leg raise or clonus   Negative Balwinder's bilaterally.    Sensation intact to light touch throughout.  2+ DTR bilaterally.    Full back ROM; no pain with all ROM  Full neck ROM; no pain with all ROM.    Negative Lhermitte's or Spurling's    There is no tenderness to palpation of midline spine or paraspinal muscles.  There is no bony step-off or bony tenderness to palpation.     No tenderness to palpation of bilateral SI joint or trochanteric bursa  No pain on hip ROM.        Lymphadenopathy:     She has no cervical adenopathy.   Neurological: She is alert and oriented to person, place, and time. She has normal motor skills, normal sensation and intact cranial nerves. She displays no weakness, facial symmetry and normal reflexes. No cranial nerve deficit or sensory deficit. She exhibits normal muscle tone. She has a normal Finger-Nose-Finger Test. She shows no pronator drift. She displays no seizure activity. Gait and coordination normal. Coordination normal. GCS eye subscore is 4. GCS verbal subscore is 5. GCS motor subscore is 6.   Skin: Skin is warm, dry, not diaphoretic and no rash. Capillary refill takes less than 2 seconds.      Psychiatric: Her speech is normal and behavior is normal. Mood and thought content normal.   Nursing note and vitals reviewed.            Assessment:       1. Strain of right hip, initial encounter    2. Right hip pain    3. Right leg pain    4. Tobacco abuse counseling        On exam, patient is nontoxic appearing and vitals are stable.  Patient is essentially neurovascularly intact on exam.  Xrays showed no acute fracture or dislocation.  Diagnostic testing results  were independently reviewed and interpreted, which were discussed in depth with patient. Patient was given Toradol injection clinic for the pain. Patient was prescribed meds and recommended OTC treatments for their symptoms. Patient was treated conservatively with activity modification, OTC pain reliever, muscle stretches, and RICE therapy. Patient was referred to orthopedics for evaluation. If symptoms do not improve/worsens, patient was referred back to PCP for continued outpatient workup and management.     Patient was counseled on the dangers of tobacco use/smoking.    Discussed diet, exercise, and weight loss given their obesity.    Patient was instructed to return for re-evaluation for any worsening or change in current symptoms. Strict ED versus clinic precautions given in depth. Discharge and follow-up instructions given verbally/printed with the patient who expressed understanding and willingness to comply with my recommendations.  Patient verbalized understanding and agreed with the entirety of plan of care.    Note dictated with voice recognition software, please excuse any grammatical errors.    Plan:         Strain of right hip, initial encounter    Right hip pain  -     XR HIP 2 VIEW RIGHT; Future; Expected date: 12/22/2020  -     ketorolac injection 30 mg  -     Ambulatory referral/consult to Orthopedics  -     diclofenac (VOLTAREN) 50 MG EC tablet; Take 1 tablet (50 mg total) by mouth 2 (two) times daily as needed.  Dispense: 30 tablet; Refill: 0    Right leg pain  -     XR HIP 2 VIEW RIGHT; Future; Expected date: 12/22/2020  -     ketorolac injection 30 mg  -     Ambulatory referral/consult to Orthopedics  -     diclofenac (VOLTAREN) 50 MG EC tablet; Take 1 tablet (50 mg total) by mouth 2 (two) times daily as needed.  Dispense: 30 tablet; Refill: 0    Tobacco abuse counseling           Patient Instructions   PLEASE READ YOUR DISCHARGE INSTRUCTIONS ENTIRELY AS IT CONTAINS IMPORTANT INFORMATION.  -  OTC Tylenol/anti-inflammatory as needed for pain  - do not use OTC anti-inflammatory if taking prescribed (Rx) anti-inflammatory; start Rx inflammatory tomorrow.  Because you were given a concentrated anti-inflammatory injection in clinic.  - continue heat/ice compression, rice therapy, and muscle stretches.   - Radiographs and all diagnostic testing reviewed with patient  - if no improvement or worsening symptoms, recommend follow-up with *orthopedics for further evaluation.  Please call the number below to set up appointment; a referral has been placed.  - If you smoke, please stop smoking.  - discussed weight loss given obesity  -You must understand that you've received an Urgent Care treatment only and that you may be released before all your medical problems are known or treated. You, the patient, will arrange for follow up care as instructed. Please arrange follow up with your primary medical clinic within 2-5 days if your signs and symptoms have not resolved or worsen.   - Follow up with your PCP or specialty clinic as directed.  You can call [phone] or [phone] to schedule an appointment with the appropriate provider.    - If your condition worsens or fails to improve we recommend that you receive another evaluation at the emergency room immediately or contact your primary medical clinic to discuss your concerns in next 2-5 days.  Strict ER versus clinic precautions given.      RED FLAGS/WARNING SYMPTOMS DISCUSSED WITH PATIENT THAT WOULD WARRANT EMERGENT MEDICAL ATTENTION. Patient aware and verbalized understanding.    Hip Strain    You have a strain of the muscles around the hip joint. A muscle strain is a stretching or tearing of muscle fibers. This causes pain, especially when you move that muscle. There may also be some swelling and bruising.  Home care  · Stay off the injured leg as much as possible until you can walk on it without pain. If you have a lot of pain with walking, crutches or a  walker may be prescribed. These can be rented or purchased at many pharmacies and surgical or orthopedic supply stores. Follow your healthcare provider's advice regarding when to begin putting weight on that leg.  · Apply an ice pack over the injured area for 15 to 20 minutes every 3 to 6 hours. You should do this for the first 24 to 48 hours. You can make an ice pack by filling a plastic bag that seals at the top with ice cubes and then wrapping it with a thin towel. Be careful not to injure your skin with the ice treatments. Ice should never be applied directly to skin. Continue the use of ice packs for relief of pain and swelling as needed. After 48 hours, apply heat (warm shower or warm bath) for 15 to 20 minutes several times a day, or alternate ice and heat.  · You may use over-the-counter pain medicine to control pain, unless another pain medicine was prescribed. If you have chronic liver or kidney disease or ever had a stomach ulcer or GI bleeding, talk with your healthcare provider beforeusing these medicines.  · If you play sports, you may resume these activities when you are able to hop and run on the injured leg without pain.  Follow-up care  Follow up with your healthcare provider, or as advised. If your symptoms do not begin to get better after a week, more tests may be needed.  If X-rays were taken, you will be told of any new findings that may affect your care.  When to seek medical advice  Call your healthcare provider right away if any of these occur:  · Increased swelling or bruising  · Increased pain  · Losing the ability to put weight on the injured side  Date Last Reviewed: 11/19/2015  © 5360-1228 [employer]. [address] [place] [address], [place] [address]. All rights reserved. This information is not intended as a substitute for professional medical care. Always follow your healthcare professional's instructions.

## 2020-12-23 ENCOUNTER — TELEPHONE (OUTPATIENT)
Dept: URGENT CARE | Facility: CLINIC | Age: 55
End: 2020-12-23

## 2020-12-24 ENCOUNTER — TELEPHONE (OUTPATIENT)
Dept: URGENT CARE | Facility: CLINIC | Age: 55
End: 2020-12-24

## 2021-04-15 ENCOUNTER — PATIENT MESSAGE (OUTPATIENT)
Dept: RESEARCH | Facility: HOSPITAL | Age: 56
End: 2021-04-15

## 2021-08-16 ENCOUNTER — LAB VISIT (OUTPATIENT)
Dept: PRIMARY CARE CLINIC | Facility: OTHER | Age: 56
End: 2021-08-16
Attending: INTERNAL MEDICINE
Payer: MEDICAID

## 2021-08-16 DIAGNOSIS — Z20.822 ENCOUNTER FOR LABORATORY TESTING FOR COVID-19 VIRUS: ICD-10-CM

## 2021-08-16 PROCEDURE — U0003 INFECTIOUS AGENT DETECTION BY NUCLEIC ACID (DNA OR RNA); SEVERE ACUTE RESPIRATORY SYNDROME CORONAVIRUS 2 (SARS-COV-2) (CORONAVIRUS DISEASE [COVID-19]), AMPLIFIED PROBE TECHNIQUE, MAKING USE OF HIGH THROUGHPUT TECHNOLOGIES AS DESCRIBED BY CMS-2020-01-R: HCPCS | Performed by: INTERNAL MEDICINE

## 2021-08-17 LAB
SARS-COV-2 RNA RESP QL NAA+PROBE: NOT DETECTED
SARS-COV-2- CYCLE NUMBER: -1

## 2023-08-18 NOTE — PLAN OF CARE
09/30/19 0958   Post-Acute Status   Post-Acute Authorization Placement;Other   Other Status No Post-Acute Service Needs     Patient expected to discharge home today with no needs. SW will continue to follow up.      Anjana Simmons LMSW  [facility]   [id number]    
CM to bedside to assess patient. Pt states she does not have a ride home. She states she lives with her son who can't drive and her exhusband is out of town. She does not use equipment at home.    My health packet left at bedside and explained to patient.     Pt does not have a PCP.       09/30/19 0921   Discharge Assessment   Assessment Type Discharge Planning Assessment   Confirmed/corrected address and phone number on facesheet? Yes   Assessment information obtained from? Patient   Expected Length of Stay (days) 1   Communicated expected length of stay with patient/caregiver yes   Prior to hospitilization cognitive status: Alert/Oriented   Prior to hospitalization functional status: Independent   Current cognitive status: Alert/Oriented   Current Functional Status: Independent   Lives With child(soha), dependent   Able to Return to Prior Arrangements yes   Is patient able to care for self after discharge? Yes   Who are your caregiver(s) and their phone number(s)? Vincent Vera (ex ) [phone]   Patient's perception of discharge disposition home or selfcare   Readmission Within the Last 30 Days no previous admission in last 30 days   Patient currently being followed by outpatient case management? No   Patient currently receives any other outside agency services? No   Equipment Currently Used at Home none   Does the patient have transportation home? No   Does the patient receive services at the [facility]madin Clinic? No   Discharge Plan A Home   Discharge Plan B Home   DME Needed Upon Discharge  other (see comments)  (TBD)   Patient/Family in Agreement with Plan yes     Julie Haase RN  Case Management [phone]    
Pt d/c home with no needs.       10/01/19 0728   Final Note   Assessment Type Final Discharge Note   Anticipated Discharge Disposition Home   Hospital Follow Up  Appt(s) scheduled?   (Scheduled per ortho)   Discharge plans and expectations educations in teach back method with documentation complete? Yes     Julie Haase RN  Case Management [phone]      
The patient is a 11y Female complaining of finger pain/injury.